# Patient Record
Sex: MALE | Race: ASIAN | Employment: FULL TIME | URBAN - METROPOLITAN AREA
[De-identification: names, ages, dates, MRNs, and addresses within clinical notes are randomized per-mention and may not be internally consistent; named-entity substitution may affect disease eponyms.]

---

## 2019-07-16 ENCOUNTER — OFFICE VISIT (OUTPATIENT)
Dept: GASTROENTEROLOGY | Facility: CLINIC | Age: 54
End: 2019-07-16
Payer: COMMERCIAL

## 2019-07-16 ENCOUNTER — TELEPHONE (OUTPATIENT)
Dept: GASTROENTEROLOGY | Facility: CLINIC | Age: 54
End: 2019-07-16

## 2019-07-16 VITALS
SYSTOLIC BLOOD PRESSURE: 112 MMHG | BODY MASS INDEX: 27.22 KG/M2 | DIASTOLIC BLOOD PRESSURE: 72 MMHG | HEIGHT: 65 IN | HEART RATE: 69 BPM | WEIGHT: 163.38 LBS

## 2019-07-16 DIAGNOSIS — R13.19 ESOPHAGEAL DYSPHAGIA: Primary | ICD-10-CM

## 2019-07-16 DIAGNOSIS — K22.0 ACHALASIA: ICD-10-CM

## 2019-07-16 PROCEDURE — 99243 OFF/OP CNSLTJ NEW/EST LOW 30: CPT | Performed by: INTERNAL MEDICINE

## 2019-07-16 RX ORDER — LISINOPRIL 10 MG/1
TABLET ORAL
Refills: 2 | COMMUNITY
Start: 2019-05-07

## 2019-07-16 RX ORDER — OMEPRAZOLE 20 MG/1
CAPSULE, DELAYED RELEASE ORAL
Refills: 1 | COMMUNITY
Start: 2019-06-10

## 2019-07-16 NOTE — PATIENT INSTRUCTIONS
Problems swallowing, suspected achalasia  - chew food carefully and alternate solids and liquids  - set up esophageal motility study at the hospital

## 2019-07-16 NOTE — TELEPHONE ENCOUNTER
Pt needs to b scheduled for a motility study. Did not have the prep instructions here and was not sure how to schedule this procedure. Routing to GI schedulers to get pt scheduled ASAP.     Scheduled for:  Motility study  Provider Name: Cindy Braxton  Date:    Lyndia Getting

## 2019-07-16 NOTE — PROGRESS NOTES
Db Hurd is a 53year old male. HPI:   Patient presents with:  Consult      The patient is a 79-year-old male who reports a chronic history of dysphagia for which we are seeing him in the office today.   He states he has been diagnosed with achalasia Used    Alcohol use: Never      Frequency: Never    Drug use: Never       Medications (Active prior to today's visit):    Current Outpatient Medications:  lisinopril 10 MG Oral Tab TK 1 T PO D Disp:  Rfl: 2   omeprazole 20 MG Oral Capsule Delayed Release T

## 2019-07-17 NOTE — TELEPHONE ENCOUNTER
Forwarded to GI schedulers. Thanks. I spoke to FREDDIE LOZA in office, states she had also spoken/routed to .

## 2019-07-18 NOTE — TELEPHONE ENCOUNTER
This has already been sent to GI schedulers yesterday. Pt seen in office 7/16. I contacted pt, explained that he would be receiving a call from our schedulers in the next few days.  Schedulers--could you also make sure you show Kenyon Becker below how to schedule t

## 2019-07-18 NOTE — TELEPHONE ENCOUNTER
Pt calling demanding to be scheduled . States is he going to have to wait a month or 2 to be scheduled .  Informed him someone would be calling him to schedule procedure given turnaround time and pt was upset and would like the  to call his dr Yazmin Jiang

## 2019-07-18 NOTE — TELEPHONE ENCOUNTER
Scheduled for:  Esophageal Manometry 25950  Provider Name: Dr. Vladimir Riggs  Date:  7/26/19  Location:  [facility]  Sedation:  None  Time:   0730 (pt is aware to arrive at 0630)   Prep:  NPO after 1800 the day before the procedure   This patient will  the instruc

## 2019-07-26 ENCOUNTER — HOSPITAL ENCOUNTER (OUTPATIENT)
Facility: HOSPITAL | Age: 54
Setting detail: HOSPITAL OUTPATIENT SURGERY
Discharge: HOME | End: 2019-07-26
Attending: INTERNAL MEDICINE | Admitting: INTERNAL MEDICINE
Payer: COMMERCIAL

## 2019-07-26 VITALS
HEIGHT: 65 IN | RESPIRATION RATE: 14 BRPM | SYSTOLIC BLOOD PRESSURE: 141 MMHG | OXYGEN SATURATION: 100 % | BODY MASS INDEX: 27.49 KG/M2 | HEART RATE: 64 BPM | WEIGHT: 165 LBS | DIASTOLIC BLOOD PRESSURE: 90 MMHG

## 2019-07-26 DIAGNOSIS — K22.0 ACHALASIA: ICD-10-CM

## 2019-07-26 DIAGNOSIS — R13.19 ESOPHAGEAL DYSPHAGIA: ICD-10-CM

## 2019-07-26 PROCEDURE — 4A0B7BZ MEASUREMENT OF GASTROINTESTINAL PRESSURE, VIA NATURAL OR ARTIFICIAL OPENING: ICD-10-PCS | Performed by: INTERNAL MEDICINE

## 2019-07-26 PROCEDURE — 91010 ESOPHAGUS MOTILITY STUDY: CPT | Performed by: INTERNAL MEDICINE

## 2019-07-26 NOTE — OR NURSING
Esophageal manometry placed to left nare at 45.5cm   Multiple failed swallows Dr Ginette Beasley notified   Pt vomited 60cc phlegm but able to tolerate the test  Dr Ginette Beasley notified

## 2019-07-27 NOTE — H&P
History & Physical Examination    Patient Name: Tico Lazo  MRN: [id number]  [facility]: [id number]  YOB: 1965    Diagnosis: achalasia    No medications prior to admission. No current facility-administered medications for this encounter.      Al

## 2019-08-02 ENCOUNTER — TELEPHONE (OUTPATIENT)
Dept: GASTROENTEROLOGY | Facility: CLINIC | Age: 54
End: 2019-08-02

## 2019-08-02 NOTE — TELEPHONE ENCOUNTER
RN to contact pt and  Have him follow up in the office, the esophagram was c/w achalasia.    - also have him see Dr. Alexandra Roll

## 2019-08-02 NOTE — OPERATIVE REPORT
KRIS COSTELLO \[facility] [place] [facility]\"" - [facility] Esophageal Motility Report      Preoperative Diagnosis:  -dysphagia  -history of achalasia    Postoperative Diagnosis:  -no peristalsis noted esophageal body    Procedure:    Esophageal Motility Study       Surgeon:  Nuzhat Bailey

## 2019-08-02 NOTE — TELEPHONE ENCOUNTER
Patient called back set up appt w/ Dr. Chase Angeles for  F/u due to esophagram was c/w achalasia.     Future Appointments   Date Time Provider Leatha Elizalde   8/20/2019  1:15 PM Jennifer Mcintosh MD [facility] [place], operated by [facility]     Patient also given Raji Richard MD c

## 2019-08-06 NOTE — TELEPHONE ENCOUNTER
Najma Prasad office states pt is inquiring why appt with Dr. Anna Snow is needed when Dr. Morales Choe will be performing the surgery.  Please call [phone]

## 2019-08-06 NOTE — TELEPHONE ENCOUNTER
Dr. Wendy Lauren please advise why pt needs to see Dr. Eyad Hopkins instead of his regular surgeon Dr. Jeff Luz. Pt would like Dr. Jeff Luz to do surgery if possible. Aware you are out of office at this time. Thanks. Returned call to Randee Bose at Dr. Vladislav Rodarte office.  [place]

## 2019-08-12 NOTE — TELEPHONE ENCOUNTER
I was out of office and wanted to make sure the pt had follow up with a surgeon and did not realize he had seen Dr. Wilbur David when I sent the message.

## 2019-08-12 NOTE — TELEPHONE ENCOUNTER
Spoke to Trish at Dr. Mcnair [facility] ([phone]) and reviewed Dr. Reva Schmidt message below. She states they and pt had been confused b/c Dr. Shirin Freeman is the one that referred the pt to Dr. Reva Schmidt in the first place.  I told her to disregard the referral to Dr. Saurav Madison

## 2019-08-20 ENCOUNTER — OFFICE VISIT (OUTPATIENT)
Dept: GASTROENTEROLOGY | Facility: CLINIC | Age: 54
End: 2019-08-20
Payer: COMMERCIAL

## 2019-08-20 VITALS
SYSTOLIC BLOOD PRESSURE: 113 MMHG | HEIGHT: 65 IN | WEIGHT: 162 LBS | BODY MASS INDEX: 26.99 KG/M2 | HEART RATE: 82 BPM | DIASTOLIC BLOOD PRESSURE: 69 MMHG

## 2019-08-20 DIAGNOSIS — R13.19 ESOPHAGEAL DYSPHAGIA: Primary | ICD-10-CM

## 2019-08-20 DIAGNOSIS — K22.0 ACHALASIA: ICD-10-CM

## 2019-08-20 PROCEDURE — 99213 OFFICE O/P EST LOW 20 MIN: CPT | Performed by: INTERNAL MEDICINE

## 2019-08-20 NOTE — PATIENT INSTRUCTIONS
Achalasia  - follow up with Dr. Jesus Alberto Garcia for surgery   - prior workup reviewed, two prior EGDs and esophagram recently was negative

## 2019-08-21 NOTE — PROGRESS NOTES
Osiel Simpson is a 53year old male. HPI:   Patient presents with: Follow - Up    The patient is a 53-year-old male who has a history of high blood pressure and achalasia.   He has a history of chronic dysphagia prior work-up at [facility] 3 years a Dysphagia/achalasia    The patient will follow back up with Dr. Steven Huang for surgical evaluation and treatment, he has failed endoscopic therapy of his dysphagia/achalasia. My concern is he may be developing weakened esophagus without peristalsis.   Risks

## 2019-09-05 ENCOUNTER — HOSPITAL (OUTPATIENT)
Dept: OTHER | Age: 54
End: 2019-09-05

## 2019-09-05 ENCOUNTER — DIAGNOSTIC TRANS (OUTPATIENT)
Dept: OTHER | Age: 54
End: 2019-09-05

## 2019-09-05 PROCEDURE — 99285 EMERGENCY DEPT VISIT HI MDM: CPT | Performed by: EMERGENCY MEDICINE

## (undated) DEVICE — ALL PURPOSE SPONGES,NONWOVEN, 4 PLY: Brand: CURITY

## (undated) DEVICE — 35 ML SYRINGE REGULAR TIP: Brand: MONOJECT